# Patient Record
Sex: FEMALE | Race: WHITE | NOT HISPANIC OR LATINO | URBAN - METROPOLITAN AREA
[De-identification: names, ages, dates, MRNs, and addresses within clinical notes are randomized per-mention and may not be internally consistent; named-entity substitution may affect disease eponyms.]

---

## 2023-08-07 ENCOUNTER — APPOINTMENT (RX ONLY)
Dept: URBAN - METROPOLITAN AREA CLINIC 145 | Facility: CLINIC | Age: 43
Setting detail: DERMATOLOGY
End: 2023-08-07

## 2023-08-07 DIAGNOSIS — B36.0 PITYRIASIS VERSICOLOR: ICD-10-CM

## 2023-08-07 PROCEDURE — ? COUNSELING

## 2023-08-07 PROCEDURE — 99203 OFFICE O/P NEW LOW 30 MIN: CPT

## 2023-08-07 PROCEDURE — ? PRESCRIPTION

## 2023-08-07 RX ORDER — FLUCONAZOLE 150 MG/1
TABLET ORAL
Qty: 1 | Refills: 0 | Status: ERX | COMMUNITY
Start: 2023-08-07

## 2023-08-07 RX ORDER — KETOCONAZOLE 20 MG/G
CREAM TOPICAL BID
Qty: 60 | Refills: 1 | Status: ERX | COMMUNITY
Start: 2023-08-07

## 2023-08-07 RX ADMIN — FLUCONAZOLE: 150 TABLET ORAL at 00:00

## 2023-08-07 RX ADMIN — KETOCONAZOLE: 20 CREAM TOPICAL at 00:00

## 2023-08-07 ASSESSMENT — LOCATION SIMPLE DESCRIPTION DERM
LOCATION SIMPLE: UPPER BACK
LOCATION SIMPLE: RIGHT SHOULDER
LOCATION SIMPLE: LEFT SHOULDER

## 2023-08-07 ASSESSMENT — LOCATION ZONE DERM
LOCATION ZONE: TRUNK
LOCATION ZONE: ARM

## 2023-08-07 ASSESSMENT — LOCATION DETAILED DESCRIPTION DERM
LOCATION DETAILED: RIGHT POSTERIOR SHOULDER
LOCATION DETAILED: LEFT POSTERIOR SHOULDER
LOCATION DETAILED: INFERIOR THORACIC SPINE

## 2023-11-06 ENCOUNTER — APPOINTMENT (RX ONLY)
Dept: URBAN - METROPOLITAN AREA CLINIC 145 | Facility: CLINIC | Age: 43
Setting detail: DERMATOLOGY
End: 2023-11-06

## 2023-11-06 DIAGNOSIS — L81.4 OTHER MELANIN HYPERPIGMENTATION: ICD-10-CM

## 2023-11-06 DIAGNOSIS — L738 OTHER SPECIFIED DISEASES OF HAIR AND HAIR FOLLICLES: ICD-10-CM | Status: INADEQUATELY CONTROLLED

## 2023-11-06 DIAGNOSIS — L57.8 OTHER SKIN CHANGES DUE TO CHRONIC EXPOSURE TO NONIONIZING RADIATION: ICD-10-CM

## 2023-11-06 DIAGNOSIS — D18.0 HEMANGIOMA: ICD-10-CM

## 2023-11-06 DIAGNOSIS — L663 OTHER SPECIFIED DISEASES OF HAIR AND HAIR FOLLICLES: ICD-10-CM | Status: INADEQUATELY CONTROLLED

## 2023-11-06 DIAGNOSIS — L73.9 FOLLICULAR DISORDER, UNSPECIFIED: ICD-10-CM | Status: INADEQUATELY CONTROLLED

## 2023-11-06 DIAGNOSIS — D22 MELANOCYTIC NEVI: ICD-10-CM

## 2023-11-06 PROBLEM — D22.61 MELANOCYTIC NEVI OF RIGHT UPPER LIMB, INCLUDING SHOULDER: Status: ACTIVE | Noted: 2023-11-06

## 2023-11-06 PROBLEM — D23.72 OTHER BENIGN NEOPLASM OF SKIN OF LEFT LOWER LIMB, INCLUDING HIP: Status: ACTIVE | Noted: 2023-11-06

## 2023-11-06 PROBLEM — D22.5 MELANOCYTIC NEVI OF TRUNK: Status: ACTIVE | Noted: 2023-11-06

## 2023-11-06 PROBLEM — D18.01 HEMANGIOMA OF SKIN AND SUBCUTANEOUS TISSUE: Status: ACTIVE | Noted: 2023-11-06

## 2023-11-06 PROBLEM — L02.426 FURUNCLE OF LEFT LOWER LIMB: Status: ACTIVE | Noted: 2023-11-06

## 2023-11-06 PROBLEM — L02.425 FURUNCLE OF RIGHT LOWER LIMB: Status: ACTIVE | Noted: 2023-11-06

## 2023-11-06 PROBLEM — D22.62 MELANOCYTIC NEVI OF LEFT UPPER LIMB, INCLUDING SHOULDER: Status: ACTIVE | Noted: 2023-11-06

## 2023-11-06 PROCEDURE — 99214 OFFICE O/P EST MOD 30 MIN: CPT | Mod: 25

## 2023-11-06 PROCEDURE — ? COUNSELING

## 2023-11-06 PROCEDURE — 17110 DESTRUCTION B9 LES UP TO 14: CPT

## 2023-11-06 PROCEDURE — ? FULL BODY SKIN EXAM

## 2023-11-06 PROCEDURE — ? TREATMENT REGIMEN

## 2023-11-06 PROCEDURE — ? PRESCRIPTION

## 2023-11-06 PROCEDURE — ? BENIGN DESTRUCTION

## 2023-11-06 RX ORDER — CLINDAMYCIN PHOSPHATE 10 MG/ML
SOLUTION TOPICAL
Qty: 60 | Refills: 3 | Status: ERX | COMMUNITY
Start: 2023-11-06

## 2023-11-06 RX ADMIN — CLINDAMYCIN PHOSPHATE: 10 SOLUTION TOPICAL at 00:00

## 2023-11-06 ASSESSMENT — LOCATION DETAILED DESCRIPTION DERM
LOCATION DETAILED: INFERIOR MID FOREHEAD
LOCATION DETAILED: LEFT PROXIMAL PRETIBIAL REGION
LOCATION DETAILED: RIGHT ELBOW
LOCATION DETAILED: LEFT INFERIOR CENTRAL MALAR CHEEK
LOCATION DETAILED: LEFT PROXIMAL POSTERIOR THIGH
LOCATION DETAILED: INFERIOR THORACIC SPINE
LOCATION DETAILED: LEFT ELBOW
LOCATION DETAILED: RIGHT LATERAL SUPERIOR CHEST
LOCATION DETAILED: RIGHT PROXIMAL DORSAL FOREARM
LOCATION DETAILED: LEFT ANTERIOR DISTAL UPPER ARM
LOCATION DETAILED: RIGHT ANTERIOR DISTAL UPPER ARM
LOCATION DETAILED: RIGHT PROXIMAL POSTERIOR THIGH
LOCATION DETAILED: RIGHT PROXIMAL PRETIBIAL REGION
LOCATION DETAILED: SUPERIOR THORACIC SPINE
LOCATION DETAILED: LEFT PROXIMAL DORSAL FOREARM
LOCATION DETAILED: PERIUMBILICAL SKIN

## 2023-11-06 ASSESSMENT — LOCATION SIMPLE DESCRIPTION DERM
LOCATION SIMPLE: INFERIOR FOREHEAD
LOCATION SIMPLE: LEFT CHEEK
LOCATION SIMPLE: RIGHT UPPER ARM
LOCATION SIMPLE: RIGHT FOREARM
LOCATION SIMPLE: RIGHT ELBOW
LOCATION SIMPLE: LEFT POSTERIOR THIGH
LOCATION SIMPLE: ABDOMEN
LOCATION SIMPLE: LEFT UPPER ARM
LOCATION SIMPLE: LEFT ELBOW
LOCATION SIMPLE: UPPER BACK
LOCATION SIMPLE: RIGHT PRETIBIAL REGION
LOCATION SIMPLE: CHEST
LOCATION SIMPLE: LEFT PRETIBIAL REGION
LOCATION SIMPLE: RIGHT POSTERIOR THIGH
LOCATION SIMPLE: LEFT FOREARM

## 2023-11-06 ASSESSMENT — LOCATION ZONE DERM
LOCATION ZONE: FACE
LOCATION ZONE: ARM
LOCATION ZONE: TRUNK
LOCATION ZONE: LEG

## 2023-11-06 NOTE — PROCEDURE: BENIGN DESTRUCTION
Consent: The patient's consent was obtained including but not limited to risks of crusting, scabbing, blistering, scarring, darker or lighter pigmentary change, recurrence, incomplete removal and infection.
Medical Necessity Information: It is in your best interest to select a reason for this procedure from the list below. All of these items fulfill various CMS LCD requirements except the new and changing color options.
Medical Necessity Clause: This procedure was medically necessary because the lesions that were treated were:
Detail Level: Detailed
Render Post-Care Instructions In Note?: no
Include Z78.9 (Other Specified Conditions Influencing Health Status) As An Associated Diagnosis?: Yes
Anesthesia Volume In Cc: 0.5
Post-Care Instructions: I reviewed with the patient in detail post-care instructions. Patient is to wear sunprotection, and avoid picking at any of the treated lesions. Pt may apply Vaseline to crusted or scabbing areas.
Treatment Number (Will Not Render If 0): 0

## 2023-12-28 ENCOUNTER — APPOINTMENT (RX ONLY)
Dept: URBAN - METROPOLITAN AREA CLINIC 145 | Facility: CLINIC | Age: 43
Setting detail: DERMATOLOGY
End: 2023-12-28

## 2023-12-28 DIAGNOSIS — Z41.9 ENCOUNTER FOR PROCEDURE FOR PURPOSES OTHER THAN REMEDYING HEALTH STATE, UNSPECIFIED: ICD-10-CM

## 2023-12-28 PROCEDURE — ? VI PEEL

## 2023-12-28 NOTE — PROCEDURE: VI PEEL
Prep: The treated area was degreased with pre-peel cleanser, and vaseline was applied for protection of mucous membranes.
Post-Care Instructions: I reviewed with the patient in detail post-care instructions. Patient should avoid sun exposure and wear sun protection.
Detail Level: Detailed
Treatment Number: 1
Chemical Peel: VI Peel Advanced
Consent: Prior to the procedure, written consent was obtained and risks were reviewed, including but not limited to: redness, peeling, blistering, pigmentary change, scarring, infection, and pain. Patient is aware multiple treatments may be necessary to achieve the desired outcome.
Price (Use Numbers Only, No Special Characters Or $): 493
Post Peel Care: After the procedure, the patient was instructed not to wash the treated area for 4-5 hours. Patient was given post care kit and was instructed to wash skin after 4-5 hours, and apply provided in post care kit towelette . Patient was instructed to follow instructions and use all 5 towelettes. Patient was instructed to avoid sun exposure, physical exercise, hot tub, etc. patient was instructed to use post peel protectant and SPF as needed. Patient was advised to start using her usual skin care routine in 7-10 days depending on her skin condition. Next VI peel was recommended in 6 weeks.

## 2024-12-16 ENCOUNTER — APPOINTMENT (OUTPATIENT)
Dept: URBAN - METROPOLITAN AREA CLINIC 145 | Facility: CLINIC | Age: 44
Setting detail: DERMATOLOGY
End: 2024-12-16

## 2024-12-16 DIAGNOSIS — Z41.9 ENCOUNTER FOR PROCEDURE FOR PURPOSES OTHER THAN REMEDYING HEALTH STATE, UNSPECIFIED: ICD-10-CM

## 2024-12-16 PROCEDURE — ? COSMETIC CONSULTATION: CHEMICAL PEELS

## 2024-12-16 ASSESSMENT — LOCATION DETAILED DESCRIPTION DERM: LOCATION DETAILED: LEFT INFERIOR FOREHEAD

## 2024-12-16 ASSESSMENT — LOCATION ZONE DERM: LOCATION ZONE: FACE

## 2024-12-16 ASSESSMENT — LOCATION SIMPLE DESCRIPTION DERM: LOCATION SIMPLE: LEFT FOREHEAD

## 2024-12-16 NOTE — PROCEDURE: COSMETIC CONSULTATION: CHEMICAL PEELS
Procedure Quote $ (Will Render In Note. Use Numbers Only, No Text Please.): 299
Send Procedure Quote As Charge: No
Detail Level: Detailed

## 2024-12-27 ENCOUNTER — APPOINTMENT (OUTPATIENT)
Dept: URBAN - METROPOLITAN AREA CLINIC 145 | Facility: CLINIC | Age: 44
Setting detail: DERMATOLOGY
End: 2024-12-27

## 2024-12-27 DIAGNOSIS — Z41.9 ENCOUNTER FOR PROCEDURE FOR PURPOSES OTHER THAN REMEDYING HEALTH STATE, UNSPECIFIED: ICD-10-CM

## 2024-12-27 PROCEDURE — ? VI PEEL

## 2024-12-27 NOTE — PROCEDURE: VI PEEL
Comments: [id number]
Post-Care Instructions: I reviewed with the patient in detail post-care instructions. Patient should avoid sun exposure and wear sun protection.
Treatment Number: 1
Prep: The treated area was degreased with pre-peel cleanser, and vaseline was applied for protection of mucous membranes.
Post Peel Care: After the procedure, the patient was instructed not to wash the treated area for 4-5 hours. Patient was given post care kit and was instructed to wash skin after 4-5 hours, and apply provided in post care kit towelette . Patient was instructed to follow instructions and use all towelettes. Patient was instructed to avoid sun exposure, physical exercise, hot tub, etc. patient was instructed to use post peel protectant and SPF as needed. Patient was advised to start using her usual skin care routine in 7-10 days depending on her skin condition. Facial is scheduled to extract milia.
Chemical Peel: VI Peel Advanced
Price (Use Numbers Only, No Special Characters Or $): 129
Consent: Prior to the procedure, written consent was obtained and risks were reviewed, including but not limited to: redness, peeling, blistering, pigmentary change, scarring, infection, and pain. Patient is aware multiple treatments may be necessary to achieve the desired outcome.
Detail Level: Detailed

## 2025-01-20 ENCOUNTER — APPOINTMENT (OUTPATIENT)
Dept: URBAN - METROPOLITAN AREA CLINIC 145 | Facility: CLINIC | Age: 45
Setting detail: DERMATOLOGY
End: 2025-01-20

## 2025-01-20 DIAGNOSIS — Z41.9 ENCOUNTER FOR PROCEDURE FOR PURPOSES OTHER THAN REMEDYING HEALTH STATE, UNSPECIFIED: ICD-10-CM

## 2025-01-20 PROCEDURE — ? HYDRAFACIAL

## 2025-01-20 ASSESSMENT — LOCATION SIMPLE DESCRIPTION DERM: LOCATION SIMPLE: LEFT FOREHEAD

## 2025-01-20 ASSESSMENT — LOCATION ZONE DERM: LOCATION ZONE: FACE

## 2025-01-20 ASSESSMENT — LOCATION DETAILED DESCRIPTION DERM: LOCATION DETAILED: LEFT INFERIOR MEDIAL FOREHEAD

## 2025-01-20 NOTE — PROCEDURE: HYDRAFACIAL
Vacuum Pressure Low Setting (Will Not Render If Set To 0): 0
Solution: Activ-4
Procedure: Fusion
Procedure: Boost
Post-Care Instructions: I reviewed with the patient in detail post-care instructions. Patient should stay away from the sun and wear sun protection until treated areas are fully healed.
Location: face
Tip Override
Price (Use Numbers Only, No Special Characters Or $): 606
Tip: Hydropeel Tip, Clear
Procedure: Extraction
Procedure: Extend and Protect
Solution Override
Tip: Hydropeel Tip, Teal
Location Override: neck and chest
Procedure: Peel
Tip: Hydropeel Tip, Blue
Indication: skin texture
Treatment Number: 1
Solution: Beta-HD
Procedure: Exfoliation
Consent: Written consent obtained, risks reviewed including but not limited to crusting, scabbing, blistering, scarring, darker or lighter pigmentary change, bruising, and/or incomplete response.
Solution: GlySal 30%

## 2025-04-03 ENCOUNTER — APPOINTMENT (OUTPATIENT)
Dept: URBAN - METROPOLITAN AREA CLINIC 145 | Facility: CLINIC | Age: 45
Setting detail: DERMATOLOGY
End: 2025-04-03

## 2025-04-03 DIAGNOSIS — Z41.9 ENCOUNTER FOR PROCEDURE FOR PURPOSES OTHER THAN REMEDYING HEALTH STATE, UNSPECIFIED: ICD-10-CM

## 2025-04-03 PROCEDURE — ? HYDRAFACIAL

## 2025-04-03 ASSESSMENT — LOCATION DETAILED DESCRIPTION DERM: LOCATION DETAILED: LEFT INFERIOR MEDIAL FOREHEAD

## 2025-04-03 ASSESSMENT — LOCATION SIMPLE DESCRIPTION DERM: LOCATION SIMPLE: LEFT FOREHEAD

## 2025-04-03 ASSESSMENT — LOCATION ZONE DERM: LOCATION ZONE: FACE

## 2025-04-03 NOTE — PROCEDURE: HYDRAFACIAL
Vacuum Pressure Low Setting (Will Not Render If Set To 0): 0
Post-Care Instructions: I reviewed with the patient in detail post-care instructions. Patient should stay away from the sun and wear sun protection until treated areas are fully healed.
Procedure: Peel
Procedure: Fusion
Procedure: Extraction
Tip Override
Indication: skin texture
Treatment Number: 1
Solution Override
Tip: Hydropeel Tip, Blue
Tip: Hydropeel Tip, Clear
Solution: GlySal 7.5%
Solution: Beta-HD
Location: face
Procedure: Exfoliation
Procedure: Extend and Protect
Procedure: Boost
Solution: Activ-4
Consent: Written consent obtained, risks reviewed including but not limited to crusting, scabbing, blistering, scarring, darker or lighter pigmentary change, bruising, and/or incomplete response.
Location Override: neck and chest
Tip: Hydropeel Tip, Teal
Price (Use Numbers Only, No Special Characters Or $): 382

## 2025-04-24 ENCOUNTER — APPOINTMENT (OUTPATIENT)
Dept: URBAN - METROPOLITAN AREA CLINIC 145 | Facility: CLINIC | Age: 45
Setting detail: DERMATOLOGY
End: 2025-04-24

## 2025-04-24 DIAGNOSIS — Z41.9 ENCOUNTER FOR PROCEDURE FOR PURPOSES OTHER THAN REMEDYING HEALTH STATE, UNSPECIFIED: ICD-10-CM

## 2025-04-24 PROCEDURE — ? CHEMICAL PEEL

## 2025-04-24 ASSESSMENT — LOCATION SIMPLE DESCRIPTION DERM: LOCATION SIMPLE: LEFT FOREHEAD

## 2025-04-24 ASSESSMENT — LOCATION DETAILED DESCRIPTION DERM: LOCATION DETAILED: LEFT INFERIOR FOREHEAD

## 2025-04-24 ASSESSMENT — LOCATION ZONE DERM: LOCATION ZONE: FACE

## 2025-04-24 NOTE — PROCEDURE: CHEMICAL PEEL
Treatment Number: 1
Expiration Date (Optional): 08/27
Post-Care Instructions: I reviewed with the patient in detail post-care instructions. Patient should avoid sun exposure and wear sun protection.
Post Peel Care: Sun protection and post-care instructions were reviewed with the patient.
Chemical Peel: TCA 35%
Consent: Prior to the procedure, written consent was obtained and risks were reviewed, including but not limited to: redness, peeling, blistering, pigmentary change, scarring, infection, and pain.
Detail Level: Zone
Lot Number (Optional): [id number]
Comments: Patient purchased package of 4 BioRePeels. Next appointment is scheduled in 8 days. GWP revision dermprotect trial and vit c+ trial.
Price (Use Numbers Only, No Special Characters Or $): 212.5
Prep: The treated area was degreased with pre-peel cleanser and 4 ever kit

## 2025-05-02 ENCOUNTER — APPOINTMENT (OUTPATIENT)
Dept: URBAN - METROPOLITAN AREA CLINIC 145 | Facility: CLINIC | Age: 45
Setting detail: DERMATOLOGY
End: 2025-05-02

## 2025-05-02 DIAGNOSIS — Z41.9 ENCOUNTER FOR PROCEDURE FOR PURPOSES OTHER THAN REMEDYING HEALTH STATE, UNSPECIFIED: ICD-10-CM

## 2025-05-02 PROCEDURE — ? CHEMICAL PEEL

## 2025-05-02 NOTE — PROCEDURE: CHEMICAL PEEL
Prep: The treated area was degreased with pre-peel cleanser and 4 ever kit
Treatment Number: 0
Consent: Prior to the procedure, written consent was obtained and risks were reviewed, including but not limited to: redness, peeling, blistering, pigmentary change, scarring, infection, and pain.
Post-Care Instructions: I reviewed with the patient in detail post-care instructions. Patient should avoid sun exposure and wear sun protection.
Lot Number (Optional): [id number]
Expiration Date (Optional): 08/27
Post Peel Care: Sun protection and post-care instructions were reviewed with the patient.
Chemical Peel: TCA 35%
Detail Level: Zone
Number Of Layers: 1
Price (Use Numbers Only, No Special Characters Or $): 212.5

## 2025-05-12 ENCOUNTER — APPOINTMENT (OUTPATIENT)
Dept: URBAN - METROPOLITAN AREA CLINIC 145 | Facility: CLINIC | Age: 45
Setting detail: DERMATOLOGY
End: 2025-05-12

## 2025-05-12 DIAGNOSIS — Z41.9 ENCOUNTER FOR PROCEDURE FOR PURPOSES OTHER THAN REMEDYING HEALTH STATE, UNSPECIFIED: ICD-10-CM

## 2025-05-12 PROCEDURE — ? CHEMICAL PEEL

## 2025-05-12 ASSESSMENT — LOCATION ZONE DERM: LOCATION ZONE: FACE

## 2025-05-12 ASSESSMENT — LOCATION DETAILED DESCRIPTION DERM: LOCATION DETAILED: LEFT INFERIOR MEDIAL FOREHEAD

## 2025-05-12 ASSESSMENT — LOCATION SIMPLE DESCRIPTION DERM: LOCATION SIMPLE: LEFT FOREHEAD

## 2025-05-12 NOTE — PROCEDURE: CHEMICAL PEEL
Post Peel Care: Sun protection and post-care instructions were reviewed with the patient.
Expiration Date (Optional): 08/27
Chemical Peel: TCA 35%
Detail Level: Zone
Number Of Layers: 1
Price (Use Numbers Only, No Special Characters Or $): 212.5
Consent: Prior to the procedure, written consent was obtained and risks were reviewed, including but not limited to: redness, peeling, blistering, pigmentary change, scarring, infection, and pain.
Prep: The treated area was degreased with pre-peel cleanser and 4 ever kit
Treatment Number: 3
Lot Number (Optional): [id number]
Post-Care Instructions: I reviewed with the patient in detail post-care instructions. Patient should avoid sun exposure and wear sun protection.

## 2025-05-30 ENCOUNTER — APPOINTMENT (OUTPATIENT)
Dept: URBAN - METROPOLITAN AREA CLINIC 145 | Facility: CLINIC | Age: 45
Setting detail: DERMATOLOGY
End: 2025-05-30

## 2025-05-30 DIAGNOSIS — Z41.9 ENCOUNTER FOR PROCEDURE FOR PURPOSES OTHER THAN REMEDYING HEALTH STATE, UNSPECIFIED: ICD-10-CM

## 2025-05-30 PROCEDURE — ? CHEMICAL PEEL

## 2025-05-30 NOTE — PROCEDURE: CHEMICAL PEEL
Number Of Layers: 1
Post-Care Instructions: I reviewed with the patient in detail post-care instructions. Patient should avoid sun exposure and wear sun protection.
Treatment Number: 4
Post Peel Care: Sun protection and post-care instructions were reviewed with the patient.
Detail Level: Zone
Expiration Date (Optional): 08/27
Price (Use Numbers Only, No Special Characters Or $): 212.13
Lot Number (Optional): [id number]
Consent: Prior to the procedure, written consent was obtained and risks were reviewed, including but not limited to: redness, peeling, blistering, pigmentary change, scarring, infection, and pain.
Chemical Peel: TCA 35%
Prep: The treated area was degreased with pre-peel cleanser and 4 ever kit

## 2025-06-26 ENCOUNTER — APPOINTMENT (OUTPATIENT)
Dept: URBAN - METROPOLITAN AREA CLINIC 145 | Facility: CLINIC | Age: 45
Setting detail: DERMATOLOGY
End: 2025-06-26

## 2025-06-26 DIAGNOSIS — Z41.9 ENCOUNTER FOR PROCEDURE FOR PURPOSES OTHER THAN REMEDYING HEALTH STATE, UNSPECIFIED: ICD-10-CM

## 2025-06-26 PROCEDURE — ? HYDRAFACIAL

## 2025-06-26 ASSESSMENT — LOCATION ZONE DERM: LOCATION ZONE: FACE

## 2025-06-26 ASSESSMENT — LOCATION SIMPLE DESCRIPTION DERM: LOCATION SIMPLE: RIGHT FOREHEAD

## 2025-06-26 ASSESSMENT — LOCATION DETAILED DESCRIPTION DERM: LOCATION DETAILED: RIGHT INFERIOR MEDIAL FOREHEAD

## 2025-06-26 NOTE — PROCEDURE: HYDRAFACIAL
Solution: Beta-HD
Number Of Passes Step 6: 0
Solution Override
Procedure: Peel
Price (Use Numbers Only, No Special Characters Or $): 823
Tip: Hydropeel Tip, Clear
Procedure: Fusion
Treatment Number: 1
Procedure: Boost
Consent: Written consent obtained, risks reviewed including but not limited to crusting, scabbing, blistering, scarring, darker or lighter pigmentary change, bruising, and/or incomplete response.
Tip: Hydropeel Tip, Blue
Post-Care Instructions: I reviewed with the patient in detail post-care instructions. Patient should stay away from the sun and wear sun protection until treated areas are fully healed.
Indication: skin texture
Solution: GlySal 30%
Procedure: Extraction
Procedure: Exfoliation
Tip: Hydropeel Tip, Teal
Procedure: Extend and Protect
Location: face
Tip Override
Solution: Activ-4

## 2025-08-14 ENCOUNTER — APPOINTMENT (OUTPATIENT)
Dept: URBAN - METROPOLITAN AREA CLINIC 145 | Facility: CLINIC | Age: 45
Setting detail: DERMATOLOGY
End: 2025-08-14

## 2025-08-14 DIAGNOSIS — Z41.9 ENCOUNTER FOR PROCEDURE FOR PURPOSES OTHER THAN REMEDYING HEALTH STATE, UNSPECIFIED: ICD-10-CM

## 2025-08-14 PROCEDURE — ? FACIAL

## 2025-08-14 ASSESSMENT — LOCATION DETAILED DESCRIPTION DERM: LOCATION DETAILED: RIGHT MEDIAL EYEBROW

## 2025-08-14 ASSESSMENT — LOCATION ZONE DERM: LOCATION ZONE: FACE

## 2025-08-14 ASSESSMENT — LOCATION SIMPLE DESCRIPTION DERM: LOCATION SIMPLE: RIGHT EYEBROW
